# Patient Record
Sex: FEMALE | Race: BLACK OR AFRICAN AMERICAN | NOT HISPANIC OR LATINO | Employment: FULL TIME | URBAN - METROPOLITAN AREA
[De-identification: names, ages, dates, MRNs, and addresses within clinical notes are randomized per-mention and may not be internally consistent; named-entity substitution may affect disease eponyms.]

---

## 2019-08-02 ENCOUNTER — APPOINTMENT (EMERGENCY)
Dept: RADIOLOGY | Facility: HOSPITAL | Age: 29
End: 2019-08-02
Payer: COMMERCIAL

## 2019-08-02 ENCOUNTER — HOSPITAL ENCOUNTER (EMERGENCY)
Facility: HOSPITAL | Age: 29
Discharge: HOME | End: 2019-08-02
Attending: EMERGENCY MEDICINE | Admitting: EMERGENCY MEDICINE
Payer: COMMERCIAL

## 2019-08-02 VITALS
RESPIRATION RATE: 22 BRPM | HEART RATE: 89 BPM | OXYGEN SATURATION: 100 % | WEIGHT: 199.96 LBS | DIASTOLIC BLOOD PRESSURE: 97 MMHG | TEMPERATURE: 97.8 F | SYSTOLIC BLOOD PRESSURE: 135 MMHG

## 2019-08-02 DIAGNOSIS — R06.02 SOB (SHORTNESS OF BREATH): ICD-10-CM

## 2019-08-02 DIAGNOSIS — R10.13 EPIGASTRIC PAIN: ICD-10-CM

## 2019-08-02 DIAGNOSIS — R07.9 CHEST PAIN: Primary | ICD-10-CM

## 2019-08-02 LAB
ALBUMIN SERPL BCP-MCNC: 3.2 G/DL (ref 3.5–5)
ALP SERPL-CCNC: 71 U/L (ref 46–116)
ALT SERPL W P-5'-P-CCNC: 19 U/L (ref 12–78)
ANION GAP SERPL CALCULATED.3IONS-SCNC: 8 MMOL/L (ref 4–13)
AST SERPL W P-5'-P-CCNC: 19 U/L (ref 5–45)
ATRIAL RATE: 78 BPM
BASOPHILS # BLD AUTO: 0.03 THOUSANDS/ΜL (ref 0–0.1)
BASOPHILS NFR BLD AUTO: 0 % (ref 0–1)
BILIRUB SERPL-MCNC: 0.35 MG/DL (ref 0.2–1)
BUN SERPL-MCNC: 15 MG/DL (ref 5–25)
CALCIUM SERPL-MCNC: 8.9 MG/DL (ref 8.3–10.1)
CHLORIDE SERPL-SCNC: 106 MMOL/L (ref 100–108)
CO2 SERPL-SCNC: 25 MMOL/L (ref 21–32)
CREAT SERPL-MCNC: 0.59 MG/DL (ref 0.6–1.3)
EOSINOPHIL # BLD AUTO: 0.2 THOUSAND/ΜL (ref 0–0.61)
EOSINOPHIL NFR BLD AUTO: 3 % (ref 0–6)
ERYTHROCYTE [DISTWIDTH] IN BLOOD BY AUTOMATED COUNT: 13.3 % (ref 11.6–15.1)
EXT PREG TEST URINE: NEGATIVE
EXT. CONTROL ED NAV: NORMAL
GFR SERPL CREATININE-BSD FRML MDRD: 143 ML/MIN/1.73SQ M
GLUCOSE SERPL-MCNC: 106 MG/DL (ref 65–140)
HCT VFR BLD AUTO: 32.5 % (ref 34.8–46.1)
HGB BLD-MCNC: 10.6 G/DL (ref 11.5–15.4)
IMM GRANULOCYTES # BLD AUTO: 0.02 THOUSAND/UL (ref 0–0.2)
IMM GRANULOCYTES NFR BLD AUTO: 0 % (ref 0–2)
LIPASE SERPL-CCNC: 227 U/L (ref 73–393)
LYMPHOCYTES # BLD AUTO: 1.71 THOUSANDS/ΜL (ref 0.6–4.47)
LYMPHOCYTES NFR BLD AUTO: 23 % (ref 14–44)
MCH RBC QN AUTO: 28.7 PG (ref 26.8–34.3)
MCHC RBC AUTO-ENTMCNC: 32.6 G/DL (ref 31.4–37.4)
MCV RBC AUTO: 88 FL (ref 82–98)
MONOCYTES # BLD AUTO: 0.51 THOUSAND/ΜL (ref 0.17–1.22)
MONOCYTES NFR BLD AUTO: 7 % (ref 4–12)
NEUTROPHILS # BLD AUTO: 4.85 THOUSANDS/ΜL (ref 1.85–7.62)
NEUTS SEG NFR BLD AUTO: 67 % (ref 43–75)
NRBC BLD AUTO-RTO: 0 /100 WBCS
P AXIS: 69 DEGREES
PLATELET # BLD AUTO: 338 THOUSANDS/UL (ref 149–390)
PMV BLD AUTO: 9.1 FL (ref 8.9–12.7)
POTASSIUM SERPL-SCNC: 3.9 MMOL/L (ref 3.5–5.3)
PR INTERVAL: 156 MS
PROT SERPL-MCNC: 7.5 G/DL (ref 6.4–8.2)
QRS AXIS: 44 DEGREES
QRSD INTERVAL: 90 MS
QT INTERVAL: 358 MS
QTC INTERVAL: 408 MS
RBC # BLD AUTO: 3.69 MILLION/UL (ref 3.81–5.12)
SODIUM SERPL-SCNC: 139 MMOL/L (ref 136–145)
T WAVE AXIS: 23 DEGREES
TROPONIN I SERPL-MCNC: <0.02 NG/ML
VENTRICULAR RATE: 78 BPM
WBC # BLD AUTO: 7.32 THOUSAND/UL (ref 4.31–10.16)

## 2019-08-02 PROCEDURE — 99284 EMERGENCY DEPT VISIT MOD MDM: CPT

## 2019-08-02 PROCEDURE — 96361 HYDRATE IV INFUSION ADD-ON: CPT

## 2019-08-02 PROCEDURE — 85025 COMPLETE CBC W/AUTO DIFF WBC: CPT | Performed by: EMERGENCY MEDICINE

## 2019-08-02 PROCEDURE — 36415 COLL VENOUS BLD VENIPUNCTURE: CPT | Performed by: EMERGENCY MEDICINE

## 2019-08-02 PROCEDURE — 93005 ELECTROCARDIOGRAM TRACING: CPT

## 2019-08-02 PROCEDURE — 99283 EMERGENCY DEPT VISIT LOW MDM: CPT | Performed by: EMERGENCY MEDICINE

## 2019-08-02 PROCEDURE — 80053 COMPREHEN METABOLIC PANEL: CPT | Performed by: EMERGENCY MEDICINE

## 2019-08-02 PROCEDURE — 71046 X-RAY EXAM CHEST 2 VIEWS: CPT

## 2019-08-02 PROCEDURE — 81025 URINE PREGNANCY TEST: CPT | Performed by: EMERGENCY MEDICINE

## 2019-08-02 PROCEDURE — 96374 THER/PROPH/DIAG INJ IV PUSH: CPT

## 2019-08-02 PROCEDURE — 83690 ASSAY OF LIPASE: CPT | Performed by: EMERGENCY MEDICINE

## 2019-08-02 PROCEDURE — 93010 ELECTROCARDIOGRAM REPORT: CPT | Performed by: INTERNAL MEDICINE

## 2019-08-02 PROCEDURE — 84484 ASSAY OF TROPONIN QUANT: CPT | Performed by: EMERGENCY MEDICINE

## 2019-08-02 RX ORDER — SUCRALFATE ORAL 1 G/10ML
1000 SUSPENSION ORAL ONCE
Status: COMPLETED | OUTPATIENT
Start: 2019-08-02 | End: 2019-08-02

## 2019-08-02 RX ORDER — FAMOTIDINE 20 MG/1
20 TABLET, FILM COATED ORAL 2 TIMES DAILY
Qty: 30 TABLET | Refills: 0 | Status: SHIPPED | OUTPATIENT
Start: 2019-08-02

## 2019-08-02 RX ORDER — SUCRALFATE ORAL 1 G/10ML
1 SUSPENSION ORAL 4 TIMES DAILY
Qty: 420 ML | Refills: 0 | Status: SHIPPED | OUTPATIENT
Start: 2019-08-02

## 2019-08-02 RX ORDER — LIDOCAINE HYDROCHLORIDE 20 MG/ML
15 SOLUTION OROPHARYNGEAL ONCE
Status: COMPLETED | OUTPATIENT
Start: 2019-08-02 | End: 2019-08-02

## 2019-08-02 RX ORDER — ACETAMINOPHEN 325 MG/1
650 TABLET ORAL ONCE
Status: COMPLETED | OUTPATIENT
Start: 2019-08-02 | End: 2019-08-02

## 2019-08-02 RX ORDER — SIMETHICONE 80 MG
160 TABLET,CHEWABLE ORAL ONCE
Status: COMPLETED | OUTPATIENT
Start: 2019-08-02 | End: 2019-08-02

## 2019-08-02 RX ADMIN — SIMETHICONE CHEW TAB 80 MG 160 MG: 80 TABLET ORAL at 06:01

## 2019-08-02 RX ADMIN — LIDOCAINE HYDROCHLORIDE 15 ML: 20 SOLUTION ORAL; TOPICAL at 06:04

## 2019-08-02 RX ADMIN — SODIUM CHLORIDE 1000 ML: 0.9 INJECTION, SOLUTION INTRAVENOUS at 06:15

## 2019-08-02 RX ADMIN — FAMOTIDINE 20 MG: 10 INJECTION, SOLUTION INTRAVENOUS at 06:14

## 2019-08-02 RX ADMIN — SUCRALFATE 1000 MG: 1 SUSPENSION ORAL at 06:03

## 2019-08-02 RX ADMIN — ACETAMINOPHEN 650 MG: 325 TABLET ORAL at 06:01

## 2019-08-02 NOTE — ED PROVIDER NOTES
History  Chief Complaint   Patient presents with    Abdominal Pain     Onset of pain across upper abdomen accompanied by SOB and nausea 20 minutes prior to arrival       Pt is a 34year old female with no known PMH presenting with chest and/or abdominal pain x 1 hour  Pt states the pain is in the lower chest and upper abdomen  States the pain is 10/10 constant and non-radiating  The pain is exacerbated when she takes a deep breath  Associated Sob and nausea  Denies vomiting  Denies cough, hemoptysis, recent trauma or surgery, unilateral leg swelling, OCP use  No prior abdominal surgeries  No fevers, lightheadedness, dizziness  Prior to Admission Medications   Prescriptions Last Dose Informant Patient Reported? Taking? PRENAT [id number] PO   Yes Yes   Sig: Take 1 tablet by mouth daily      Facility-Administered Medications: None       Past Medical History:   Diagnosis Date    UTI (urinary tract infection)        History reviewed  No pertinent surgical history  History reviewed  No pertinent family history  I have reviewed and agree with the history as documented  Social History     Tobacco Use    Smoking status: Never Smoker    Smokeless tobacco: Never Used   Substance Use Topics    Alcohol use: No    Drug use: No        Review of Systems   Constitutional: Negative for chills, diaphoresis and fever  Respiratory: Positive for shortness of breath  Negative for cough and chest tightness  Cardiovascular: Positive for chest pain  Negative for palpitations and leg swelling  Gastrointestinal: Positive for abdominal pain and nausea  Negative for diarrhea and vomiting  Genitourinary: Negative  Neurological: Negative for dizziness and light-headedness  Physical Exam  Physical Exam   Constitutional: She is oriented to person, place, and time  She appears well-developed and well-nourished  No distress  HENT:   Head: Normocephalic and atraumatic     Eyes: Conjunctivae and EOM are normal    Neck: Normal range of motion  Neck supple  Cardiovascular: Normal rate, regular rhythm, normal heart sounds and intact distal pulses  Pulmonary/Chest: Effort normal and breath sounds normal    Abdominal: Soft  Bowel sounds are normal  She exhibits no distension  There is no tenderness  Musculoskeletal: Normal range of motion  Neurological: She is alert and oriented to person, place, and time  Skin: Skin is warm and dry  Capillary refill takes less than 2 seconds  She is not diaphoretic         Vital Signs  ED Triage Vitals   Temperature Pulse Respirations Blood Pressure SpO2   08/02/19 0456 08/02/19 0453 08/02/19 0453 08/02/19 0453 08/02/19 0453   97 8 °F (36 6 °C) 89 22 135/97 100 %      Temp Source Heart Rate Source Patient Position - Orthostatic VS BP Location FiO2 (%)   08/02/19 0456 08/02/19 0453 08/02/19 0453 08/02/19 0453 --   Oral Monitor Sitting Right arm       Pain Score       08/02/19 0453       Worst Possible Pain           Vitals:    08/02/19 0453   BP: 135/97   Pulse: 89   Patient Position - Orthostatic VS: Sitting         Visual Acuity      ED Medications  Medications   sodium chloride 0 9 % bolus 1,000 mL (1,000 mL Intravenous New Bag 8/2/19 0615)   famotidine (PEPCID) injection 20 mg (20 mg Intravenous Given 8/2/19 0614)   sucralfate (CARAFATE) oral suspension 1,000 mg (1,000 mg Oral Given 8/2/19 0603)   Lidocaine Viscous HCl (XYLOCAINE) 2 % mucosal solution 15 mL (15 mL Swish & Swallow Given 8/2/19 0604)   simethicone (MYLICON) chewable tablet 160 mg (160 mg Oral Given 8/2/19 0601)   acetaminophen (TYLENOL) tablet 650 mg (650 mg Oral Given 8/2/19 0601)       Diagnostic Studies  Results Reviewed     Procedure Component Value Units Date/Time    Troponin I [[id number]]  (Normal) Collected:  08/02/19 0615    Lab Status:  Final result Specimen:  Blood from Arm, Left Updated:  08/02/19 0645     Troponin I <0 02 ng/mL     Comprehensive metabolic panel [[id number]]  (Abnormal) Collected:  08/02/19 0615    Lab Status:  Final result Specimen:  Blood from Arm, Left Updated:  08/02/19 1317     Sodium 139 mmol/L      Potassium 3 9 mmol/L      Chloride 106 mmol/L      CO2 25 mmol/L      ANION GAP 8 mmol/L      BUN 15 mg/dL      Creatinine 0 59 mg/dL      Glucose 106 mg/dL      Calcium 8 9 mg/dL      AST 19 U/L      ALT 19 U/L      Alkaline Phosphatase 71 U/L      Total Protein 7 5 g/dL      Albumin 3 2 g/dL      Total Bilirubin 0 35 mg/dL      eGFR 143 ml/min/1 73sq m     Narrative:       National Kid[employer] Disease Foundation guidelines for Chronic Kidney Disease (CKD):     Stage 1 with normal or high GFR (GFR > 90 mL/min/1 73 square meters)    Stage 2 Mild CKD (GFR = 60-89 mL/min/1 73 square meters)    Stage 3A Moderate CKD (GFR = 45-59 mL/min/1 73 square meters)    Stage 3B Moderate CKD (GFR = 30-44 mL/min/1 73 square meters)    Stage 4 Severe CKD (GFR = 15-29 mL/min/1 73 square meters)    Stage 5 End Stage CKD (GFR <15 mL/min/1 73 square meters)  Note: GFR calculation is accurate only with a steady state creatinine    Lipase [[id number]]  (Normal) Collected:  08/02/19 0615    Lab Status:  Final result Specimen:  Blood from Arm, Left Updated:  08/02/19 0642     Lipase 227 u/L     POCT pregnancy, urine [[id number]]  (Normal) Resulted:  08/02/19 0632    Lab Status:  Final result Updated:  08/02/19 0632     EXT PREG TEST UR (Ref: Negative) NEGATIVE     Control VALID    CBC and differential [[id number]]  (Abnormal) Collected:  08/02/19 0615    Lab Status:  Final result Specimen:  Blood from Arm, Left Updated:  08/02/19 0624     WBC 7 32 Thousand/uL      RBC 3 69 Million/uL      Hemoglobin 10 6 g/dL      Hematocrit 32 5 %      MCV 88 fL      MCH 28 7 pg      MCHC 32 6 g/dL      RDW 13 3 %      MPV 9 1 fL      Platelets 164 Thousands/uL      nRBC 0 /100 WBCs      Neutrophils Relative 67 %      Immat GRANS % 0 %      Lymphocytes Relative 23 %      Monocytes Relative 7 %      Eosinophils Relative 3 % Basophils Relative 0 %      Neutrophils Absolute 4 85 Thousands/µL      Immature Grans Absolute 0 02 Thousand/uL      Lymphocytes Absolute 1 71 Thousands/µL      Monocytes Absolute 0 51 Thousand/µL      Eosinophils Absolute 0 20 Thousand/µL      Basophils Absolute 0 03 Thousands/µL                  XR chest 2 views   ED Interpretation by Elroy Villanueva PA-C ([id number])   No acute cardiopulmonary disease      Final Result by Joann Cranker, DO (08/02 6047)      No acute cardiopulmonary disease is seen  Workstation performed: [id number]                    Procedures  ECG 12 Lead Documentation Only  Date/Time: 8/2/2019 6:19 AM  Performed by: Elroy Villanueva PA-C  Authorized by: Elroy Villanueva PA-C     ECG reviewed by me, the ED Provider: yes    Patient location:  ED  Previous ECG:     Previous ECG:  Compared to current    Similarity:  No change    Comparison to cardiac monitor: No    Interpretation:     Interpretation: normal    Rate:     ECG rate:  78    ECG rate assessment: normal    Rhythm:     Rhythm: sinus rhythm    Ectopy:     Ectopy: none    QRS:     QRS axis:  Normal    QRS intervals:  Normal  Conduction:     Conduction: normal    ST segments:     ST segments:  Normal  T waves:     T waves: normal             ED Course  ED Course as of Aug 02 0657   Fri Aug 02, 2019   0650 Blood work is unremarkable  EKG and CXR was negative  Patient complaining of pleuritic pain but [place] Rm is 0 at this time as well  Based on history of physical exam findings I do not feel that this is a Pe  Patient feeling minimally better from GI cocktail  I offered more medications but she would like to be discharged at this time to take care of her kids  Educated on return precautions for symptoms  Stable and ready for discharge  Follow up with PCP              HEART Risk Score      Most Recent Value   History  1 Filed at: 08/02/2019 0656   ECG  0 Filed at: 08/02/2019 0656   Age  0 Filed at: 08/02/2019 1111   Risk Factors  0 Filed at: 08/02/2019 0656   Troponin  0 Filed at: 08/02/2019 0656   Heart Score Risk Calculator   History  1 Filed at: 08/02/2019 0656   ECG  0 Filed at: 08/02/2019 0656   Age  0 Filed at: 08/02/2019 0656   Risk Factors  0 Filed at: 08/02/2019 0656   Troponin  0 Filed at: 08/02/2019 0656   HEART Score  1 Filed at: 08/02/2019 0656   HEART Score  1 Filed at: 08/02/2019 0656            PERC Rule for PE      Most Recent Value   PERC Rule for PE   Age >=50  0 Filed at: 08/02/2019 0459   HR >=100  0 Filed at: 08/02/2019 0459   O2 Sat on room air < 95%  0 Filed at: 08/02/2019 0459   History of PE or DVT  0 Filed at: 08/02/2019 0459   Recent trauma or surgery  0 Filed at: 08/02/2019 0459   Hemoptysis  0 Filed at: 08/02/2019 0459   Exogenous estrogen  0 Filed at: 08/02/2019 0459   Unilateral leg swelling  0 Filed at: 08/02/2019 0459   PERC Rule for PE Results  0 Filed at: 08/02/2019 0459                Wells' Criteria for PE      Most Recent Value   Wells' Criteria for PE   Clinical signs and symptoms of DVT  0 Filed at: 08/02/2019 6900   PE is primary diagnosis or equally likely  0 Filed at: 08/02/2019 0547   HR >100  0 Filed at: 08/02/2019 0547   Immobilization at least 3 days or Surgery in the previous 4 weeks  0 Filed at: 08/02/2019 0547   Previous, objectively diagnosed PE or DVT  0 Filed at: 08/02/2019 0547   Hemoptysis  0 Filed at: 08/02/2019 9148   Malignancy with treatment within 6 months or palliative  0 Filed at: 08/02/2019 0042   Wells' Criteria Total  0 Filed at: 08/02/2019 2293            MDM    Disposition  Final diagnoses:   Chest pain   Epigastric pain   SOB (shortness of breath)     Time reflects when diagnosis was documented in both MDM as applicable and the Disposition within this note     Time User Action Codes Description Comment    8/2/2019  6:54 AM Enio YOO Add [R07 9] Chest pain     8/2/2019  6:54 AM Enio YOO Add [R10 13] Epigastric pain     8/2/2019  6:54 AM Enio YOO Add [R06 02] SOB (shortness of breath)       ED Disposition     ED Disposition Condition Date/Time Comment    Discharge Good Fri Aug 2, 2019  6:53 AM Arslan Sandhu discharge to home/self care  Follow-up Information     Follow up With Specialties Details Why Tj Mckeon MD Family Medicine Schedule an appointment as soon as possible for a visit  As needed [address] [place] [address]  [phone]            Patient's Medications   Discharge Prescriptions    FAMOTIDINE (PEPCID) 20 MG TABLET    Take 1 tablet (20 mg total) by mouth 2 (two) times a day       Start Date: 8/2/2019  End Date: --       Order Dose: 20 mg       Quantity: 30 tablet    Refills: 0    SUCRALFATE (CARAFATE) 1 G/10 ML SUSPENSION    Take 10 mL (1 g total) by mouth 4 (four) times a day       Start Date: 8/2/2019  End Date: --       Order Dose: 1 g       Quantity: 420 mL    Refills: 0     No discharge procedures on file      ED Provider  Electronically Signed by           Elroy Villanueva PA-C  08/02/19 1404 Alexander Brooks PA-C  08/02/19 7625

## 2019-12-07 ENCOUNTER — OFFICE VISIT (OUTPATIENT)
Dept: URGENT CARE | Facility: MEDICAL CENTER | Age: 29
End: 2019-12-07
Payer: COMMERCIAL

## 2019-12-07 DIAGNOSIS — K52.9 NONINFECTIOUS GASTROENTERITIS, UNSPECIFIED TYPE: Primary | ICD-10-CM

## 2019-12-07 PROCEDURE — G0382 LEV 3 HOSP TYPE B ED VISIT: HCPCS | Performed by: FAMILY MEDICINE

## 2019-12-07 PROCEDURE — 99283 EMERGENCY DEPT VISIT LOW MDM: CPT | Performed by: FAMILY MEDICINE

## 2019-12-07 PROCEDURE — 99203 OFFICE O/P NEW LOW 30 MIN: CPT | Performed by: FAMILY MEDICINE

## 2019-12-07 RX ORDER — AMOXICILLIN AND CLAVULANATE POTASSIUM 875; 125 MG/1; MG/1
1 TABLET, FILM COATED ORAL 2 TIMES DAILY
COMMUNITY
Start: 2019-11-27 | End: 2019-12-07

## 2019-12-07 RX ORDER — ONDANSETRON 4 MG/1
4 TABLET, ORALLY DISINTEGRATING ORAL EVERY 6 HOURS PRN
Qty: 20 TABLET | Refills: 0 | Status: SHIPPED | OUTPATIENT
Start: 2019-12-07

## 2019-12-07 NOTE — PROGRESS NOTES
[facility]        NAME: James Ricketts is a 34 y o  female  : 1990    MRN: [id number]  DATE: 2019  TIME: 5:51 PM    Assessment and Plan   Noninfectious gastroenteritis, unspecified type [K52 9]  1  Noninfectious gastroenteritis, unspecified type  ondansetron (ZOFRAN-ODT) 4 mg disintegrating tablet         Patient Instructions       Follow up with PCP in 3-5 days  Proceed to  ER if symptoms worsen  Chief Complaint     Chief Complaint   Patient presents with    Vomiting     Patient relates started with vomiting, diarrhea and body aches since yesterday  Vomited x3 episodes today and diarrhea x1 episode today  No blood in stools or vomit  She is currently on Augmentin for sinus infection since last Tuesday  History of Present Illness       20-year-old female here today with complaints of nausea and vomiting which began yesterday  She is feeling nauseous at the present time had 1 episode of vomiting upon arriving at urgent care  She has also developed some abdominal pain  Also describes having loose watery stools today  Not accompanied by any blood or mucus  Refers to feeling fatigued  Denies any dizziness  She informs me she is currently on 10 day course of Augmentin for sinus infection which is to be completed tomorrow  Denies any recent sick exposure  Voiding well presently  Not tolerating fluid intake however  Review of Systems   Review of Systems   Constitutional: Positive for fatigue  Respiratory: Negative  Gastrointestinal: Positive for abdominal pain, diarrhea, nausea and vomiting           Current Medications       Current Outpatient Medications:     amoxicillin-clavulanate (AUGMENTIN) 875-125 mg per tablet, Take 1 tablet by mouth 2 (two) times a day, Disp: , Rfl:     famotidine (PEPCID) 20 mg tablet, Take 1 tablet (20 mg total) by mouth 2 (two) times a day (Patient not taking: Reported on 2019), Disp: 30 tablet, Rfl: 0    ondansetron (ZOFRAN-ODT) 4 mg disintegrating tablet, Take 1 tablet (4 mg total) by mouth every 6 (six) hours as needed for nausea or vomiting, Disp: 20 tablet, Rfl: 0    PRENAT [id number]-[id number] PO, Take 1 tablet by mouth daily, Disp: , Rfl:     sucralfate (CARAFATE) 1 g/10 mL suspension, Take 10 mL (1 g total) by mouth 4 (four) times a day (Patient not taking: Reported on 12/7/2019), Disp: 420 mL, Rfl: 0    Current Allergies     Allergies as of 12/07/2019 - Reviewed 12/07/2019   Allergen Reaction Noted    Sunscreens [aminobenzoate]  11/14/2016            The following portions of the patient's history were reviewed and updated as appropriate: allergies, current medications, past family history, past medical history, past social history, past surgical history and problem list      Past Medical History:   Diagnosis Date    UTI (urinary tract infection)        History reviewed  No pertinent surgical history  No family history on file  Medications have been verified  Objective   There were no vitals taken for this visit  Physical Exam     Physical Exam   HENT:   Moist mucous membrane  Cardiovascular: Normal rate, regular rhythm and normal heart sounds  Pulmonary/Chest: Effort normal and breath sounds normal    Abdominal: There is tenderness  Skin: Skin is warm

## 2019-12-07 NOTE — PATIENT INSTRUCTIONS
I prescribed Zofran 4 mg ODT q 6 hours p r n     Advised patient to maintain a clear liquid diet for 24 hours and advanced as tolerated  Symptoms persist or worsen or she is not voiding within 24, patient is to go to the emergency room immediately  She expressed understanding  Gastroenteritis   WHAT YOU NEED TO KNOW:   Gastroenteritis, or stomach flu, is an infection of the stomach and intestines  DISCHARGE INSTRUCTIONS:   Call 911 for any of the following:   · You have trouble breathing or a very fast pulse  Return to the emergency department if:   · You see blood in your diarrhea  · You cannot stop vomiting  · You have not urinated for 12 hours  · You feel like you are going to faint  Contact your healthcare provider if:   · You have a fever  · You continue to vomit or have diarrhea, even after treatment  · You see worms in your diarrhea  · Your mouth or eyes are dry  You are not urinating as much or as often  · You have questions or concerns about your condition or care  Medicines:   · Medicines  may be given to stop vomiting or diarrhea, decrease abdominal cramps, or treat an infection  · Take your medicine as directed  Contact your healthcare provider if you think your medicine is not helping or if you have side effects  Tell him or her if you are allergic to any medicine  Keep a list of the medicines, vitamins, and herbs you take  Include the amounts, and when and why you take them  Bring the list or the pill bottles to follow-up visits  Carry your medicine list with you in case of an emergency  Manage your symptoms:   · Drink liquids as directed  Ask your healthcare provider how much liquid to drink each day, and which liquids are best for you  You may also need to drink an oral rehydration solution (ORS)  An ORS has the right amounts of sugar, salt, and minerals in water to replace body fluids  · Eat bland foods    When you feel hungry, begin eating soft, bland foods  Examples are bananas, clear soup, potatoes, and applesauce  Do not have dairy products, alcohol, sugary drinks, or drinks with caffeine until you feel better  · Rest as much as possible  Slowly start to do more each day when you begin to feel better  Prevent the spread of gastroenteritis:  Gastroenteritis can spread easily  Keep yourself, your family, and your surroundings clean to help prevent the spread of gastroenteritis:  · Wash your hands often  Use soap and water  Wash your hands after you use the bathroom, change a child's diapers, or sneeze  Wash your hands before you prepare or eat food  · Clean surfaces and do laundry often  Wash your clothes and towels separately from the rest of the laundry  Clean surfaces in your home with antibacterial  or bleach  · Clean food thoroughly and cook safely  Wash raw vegetables before you cook  Cook meat, fish, and eggs fully  Do not use the same dishes for raw meat as you do for other foods  Refrigerate any leftover food immediately  · Be aware when you camp or travel  Drink only clean water  Do not drink from rivers or lakes unless you purify or boil the water first  When you travel, drink bottled water and do not add ice  Do not eat fruit that has not been peeled  Do not eat raw fish or meat that is not fully cooked  Follow up with your healthcare provider as directed:  Write down your questions so you remember to ask them during your visits  © 2017 [employer] Information is for End User's use only and may not be sold, redistributed or otherwise used for commercial purposes  All illustrations and images included in CareNotes® are the copyrighted property of [employer] , [employer]  or [employer]  The above information is an  only  It is not intended as medical advice for individual conditions or treatments   Talk to your doctor, nurse or pharmacist before following any medical regimen to see if it is safe and effective for you

## 2019-12-07 NOTE — PROGRESS NOTES
Subjective:    Pt is a 35 y/o female with no significant PMHx presenting today for nausea, vomiting and diarrhea for the past two days  Pt states she had one episode of bilious vomiting yesterday  She had five episodes total yesterday with three episodes today  She had one watery diarrhea beginning earlier today  She has associated sx of body aches and fatigue, but denies any fevers, chills, sore throat, nasal congestion, SOB, chest pain, problems voiding or other concerns at this time  She is finishing her last day course of Augmentin for a sinus infection she was diagnosed with one week ago  She has not had any adverse reactions to the abx prior to today  She has NKDA  LMP was 11/15 and was normal        Objective:  Gen Appearance: WDWN, NAD, alert and oriented, appears stated age, normal body habitus       HEENT:      Cardio: RRR, no MRG       Pulm: CTA bilaterally, no wheezes, rales or rhonchi       Abdomen: hyperactive bowel sounds in all four quadrants, mild diffuse tenderness  Non-distended, no hepatosplenomegaly  Assessment:    1  Gastroenteritis    Plan:    Pt was educated regarding the diagnosis and treatment plan  She was given a prescription for Zofran and instructed to go to the ED if her sx begin to worsen  She was instructed to drink plenty of fluids and to follow up with her PCP in the next few days if the sx do not improve        Lavada Kayser PA-S